# Patient Record
Sex: FEMALE | Race: WHITE
[De-identification: names, ages, dates, MRNs, and addresses within clinical notes are randomized per-mention and may not be internally consistent; named-entity substitution may affect disease eponyms.]

---

## 2020-08-28 ENCOUNTER — HOSPITAL ENCOUNTER (OUTPATIENT)
Dept: HOSPITAL 88 - CT | Age: 70
End: 2020-08-28
Attending: UROLOGY
Payer: MEDICARE

## 2020-08-28 DIAGNOSIS — D41.02: Primary | ICD-10-CM

## 2020-08-28 LAB
BUN SERPL-MCNC: 14 MG/DL (ref 7–26)
BUN/CREAT SERPL: 20 (ref 6–25)
EGFRCR SERPLBLD CKD-EPI 2021: > 60 ML/MIN (ref 60–?)

## 2020-08-28 PROCEDURE — 36415 COLL VENOUS BLD VENIPUNCTURE: CPT

## 2020-08-28 PROCEDURE — 74178 CT ABD&PLV WO CNTR FLWD CNTR: CPT

## 2020-08-28 PROCEDURE — 84520 ASSAY OF UREA NITROGEN: CPT

## 2020-08-28 PROCEDURE — 82565 ASSAY OF CREATININE: CPT

## 2020-08-28 NOTE — DIAGNOSTIC IMAGING REPORT
CT of the abdomen and pelvis, without with contrast.    



History: Left renal mass.



Comparison: None available.



Technique: Multidetector CT scanning of the abdomen and pelvis was performed

before and after the administration of intravenous contrast material according

to the renal mass protocol. Coronal and sagittal reformats were reviewed.



RADIATION DOSE:

     Total DLP: 1941.51 mGy*cm

     Dose modulation, iterative reconstruction, and/or weight based adjustment

of the mA/kV was utilized to reduce the radiation dose to as low as reasonably

achievable. 



FINDINGS:

The visualized intrathoracic contents demonstrate no significant abnormalities.



The liver is normal in size and attenuation without evidence for focal

abnormality. The gallbladder is unremarkable. There is no biliary ductal

dilatation. The stomach, spleen, pancreas, and bilateral adrenal glands are

unremarkable.



The kidneys are normal in size and location and enhance symmetrically. There is

a lobular, cystic lesion identified within the superior pole the left kidney

measuring 3.5 x 4.2 x 3.8 cm which contains thin internal septations and mildly

thickened wall. There is questionable enhancement of the internal

septations/walls. No definite focal nodular enhancement is appreciated.

Additional subcentimeter nonenhancing hypodensities are identified bilaterally

likely reflecting small cysts. There is no evidence for shadowing stones or

hydronephrosis. The kidneys excrete contrast material symmetrically. There is a

single ureter and collecting system bilaterally. No ureteral dilatation,

filling defect, or other abnormality is identified. The urinary bladder

demonstrates no significant abnormalities. The uterus and adnexa are grossly

unremarkable.



The abdominal aorta is normal in course and caliber. There is a single renal

artery bilaterally which remain patent. Atherosclerotic plaquing noted at the

origin of the left renal artery. The IVC is unremarkable. Bilateral renal veins

are patent and unremarkable.



Please note evaluation of bowel is limited without the use of enteric contrast

material. The visualized loops of small and large bowel demonstrate no evidence

of obstruction or inflammation. The appendix is visualized and appears

unremarkable. There is no ascites or intraperitoneal free air. No abnormally

enlarged lymph nodes are identified within the abdomen or pelvis. There is a

tiny fat-containing umbilical hernia present.



The osseous structures demonstrate no evidence for acute fracture or

destructive process. The extraperitoneal soft tissues are unremarkable.





IMPRESSION:



Mildly complicated cystic lesion identified within the superior pole of left

kidney measuring up to 4.2 cm. No prior examinations are available for

comparison, recommend comparison with prior imaging if available. In the

absence of prior imaging, recommend follow-up examination in 3-6 months given

mild wall thickening, internal septations, and questionable wall/septation

enhancement.



Signed by: Dr. Matt Farr MD on 8/28/2020 10:43 AM